# Patient Record
Sex: FEMALE | Race: ASIAN | URBAN - METROPOLITAN AREA
[De-identification: names, ages, dates, MRNs, and addresses within clinical notes are randomized per-mention and may not be internally consistent; named-entity substitution may affect disease eponyms.]

---

## 2020-07-24 ENCOUNTER — OFFICE VISIT (OUTPATIENT)
Dept: URBAN - METROPOLITAN AREA CLINIC 98 | Facility: CLINIC | Age: 70
End: 2020-07-24

## 2020-08-04 ENCOUNTER — LAB OUTSIDE AN ENCOUNTER (OUTPATIENT)
Dept: URBAN - METROPOLITAN AREA CLINIC 96 | Facility: CLINIC | Age: 70
End: 2020-08-04

## 2020-08-05 ENCOUNTER — OFFICE VISIT (OUTPATIENT)
Dept: URBAN - METROPOLITAN AREA CLINIC 96 | Facility: CLINIC | Age: 70
End: 2020-08-05
Payer: MEDICARE

## 2020-08-05 ENCOUNTER — OFFICE VISIT (OUTPATIENT)
Dept: URBAN - METROPOLITAN AREA CLINIC 96 | Facility: CLINIC | Age: 70
End: 2020-08-05

## 2020-08-05 ENCOUNTER — WEB ENCOUNTER (OUTPATIENT)
Dept: URBAN - METROPOLITAN AREA CLINIC 96 | Facility: CLINIC | Age: 70
End: 2020-08-05

## 2020-08-05 ENCOUNTER — LAB OUTSIDE AN ENCOUNTER (OUTPATIENT)
Dept: URBAN - METROPOLITAN AREA CLINIC 96 | Facility: CLINIC | Age: 70
End: 2020-08-05

## 2020-08-05 DIAGNOSIS — K59.00 CONSTIPATION, UNSPECIFIED CONSTIPATION TYPE: ICD-10-CM

## 2020-08-05 DIAGNOSIS — Z86.010 PERSONAL HISTORY OF COLONIC POLYPS: ICD-10-CM

## 2020-08-05 DIAGNOSIS — K21.9 GASTROESOPHAGEAL REFLUX DISEASE, ESOPHAGITIS PRESENCE NOT SPECIFIED: ICD-10-CM

## 2020-08-05 DIAGNOSIS — G20 PARKINSON DISEASE: ICD-10-CM

## 2020-08-05 DIAGNOSIS — K44.9 HIATAL HERNIA: ICD-10-CM

## 2020-08-05 PROCEDURE — 3017F COLORECTAL CA SCREEN DOC REV: CPT | Performed by: INTERNAL MEDICINE

## 2020-08-05 PROCEDURE — G8420 CALC BMI NORM PARAMETERS: HCPCS | Performed by: INTERNAL MEDICINE

## 2020-08-05 PROCEDURE — G9903 PT SCRN TBCO ID AS NON USER: HCPCS | Performed by: INTERNAL MEDICINE

## 2020-08-05 PROCEDURE — G8427 DOCREV CUR MEDS BY ELIG CLIN: HCPCS | Performed by: INTERNAL MEDICINE

## 2020-08-05 PROCEDURE — 99204 OFFICE O/P NEW MOD 45 MIN: CPT | Performed by: INTERNAL MEDICINE

## 2020-08-05 RX ORDER — ONDANSETRON HYDROCHLORIDE 4 MG/1
1 TABLET AS NEEDED TABLET, FILM COATED ORAL ONCE A DAY
Qty: 2 | Refills: 0 | OUTPATIENT
Start: 2020-08-05

## 2020-08-05 RX ORDER — POLYETHYLENE GLYOCOL 3350, SODIUM CHLORIDE, SODIUM BICARBONATE AND POTASSIUM CHLORIDE 420; 11.2; 5.72; 1.48 G/4L; G/4L; G/4L; G/4L
AS DIRECTED POWDER, FOR SOLUTION NASOGASTRIC; ORAL AS DIRECTED
Qty: 420 GRAM | Refills: 0 | OUTPATIENT
Start: 2020-08-05 | End: 2020-08-06

## 2020-08-05 NOTE — HPI-TODAY'S VISIT:
This is a 70-year-old female referred by Dr. Travis Watson for colon cancer screening as well as for some indigestion.  Previously patient was under the care of Dr. Betito Arellano.  She had both upper endoscopy and colonoscopy done by him on June 8, 2017.  The EGD revealed a tongue of Hill's mucosa and linear ulceration at the GE junction and a small amount of residue of food in the stomach, small hiatal hernia.  Colonoscopy revealed internal hemorrhoids, angulation and redundancy of the colon, some residual stool in various parts of the colon, a flat, 1 cm polyp removed from the distal transverse colon, and sigmoid diverticulosis.  Due to the prep, angulation of colon Dr. Arellano had recommended a follow-up colonoscopy in 2 to 3 years.  He had recommended a PPI for the esophageal ulcer and avoidance of NSAIDs.  Patient also apparently is a carrier of chronic hep B so he recommended yearly ultrasound and transaminase checks. Pt also has a hx of Parkinson's.

## 2020-08-05 NOTE — HPI-TODAY'S VISIT:
This is a 70-year-old female referred for colon cancer screening by Dr. Travis Watson.  Patient was last under the care of Dr. Arellano and she had both upper endoscopy and colonoscopy done on  June 8, 2017.  She had chronic esophagitis and chronic carditis seen as well as a tubular adenoma removed from the transverse colon.  She had normal random colon biopsies and TI biopsies were normal.  Dr. Arellano recommended a follow-up exam in 2 to 3 years.  She did have a hiatal hernia seen on the upper endoscopy and some retained food in the stomach.  She had also some sharp angulations of the colon suggesting possible adhesions as well as diverticulosis of the colon.  Dr. Arellano ordered a emptying scan of the stomach that was done in June 2017 and this was normal.  Pts  here with her. Pt has not been on reflux meds for years. Pts  says doesnt like to take meds so does not want to take nexium or antacids. Pt says she gets some bandlike indigestion occionally. Pt goes everyday but stools sometimes hard. Pt says appetite ok.

## 2020-08-18 ENCOUNTER — OFFICE VISIT (OUTPATIENT)
Dept: URBAN - METROPOLITAN AREA MEDICAL CENTER 28 | Facility: MEDICAL CENTER | Age: 70
End: 2020-08-18
Payer: MEDICARE

## 2020-08-18 DIAGNOSIS — D12.3 ADENOMA OF TRANSVERSE COLON: ICD-10-CM

## 2020-08-18 DIAGNOSIS — D12.2 ADENOMA OF ASCENDING COLON: ICD-10-CM

## 2020-08-18 DIAGNOSIS — D12.5 ADENOMA OF SIGMOID COLON: ICD-10-CM

## 2020-08-18 DIAGNOSIS — Z86.010 H/O ADENOMATOUS POLYP OF COLON: ICD-10-CM

## 2020-08-18 PROCEDURE — G9936 PMH PLYP/NEO CO/RECT/JUN/ANS: HCPCS | Performed by: INTERNAL MEDICINE

## 2020-08-18 PROCEDURE — 45380 COLONOSCOPY AND BIOPSY: CPT | Performed by: INTERNAL MEDICINE

## 2020-09-02 ENCOUNTER — WEB ENCOUNTER (OUTPATIENT)
Dept: URBAN - METROPOLITAN AREA CLINIC 96 | Facility: CLINIC | Age: 70
End: 2020-09-02

## 2020-10-19 ENCOUNTER — LAB OUTSIDE AN ENCOUNTER (OUTPATIENT)
Dept: URBAN - METROPOLITAN AREA CLINIC 96 | Facility: CLINIC | Age: 70
End: 2020-10-19

## 2020-10-20 ENCOUNTER — OFFICE VISIT (OUTPATIENT)
Dept: URBAN - METROPOLITAN AREA CLINIC 96 | Facility: CLINIC | Age: 70
End: 2020-10-20
Payer: MEDICARE

## 2020-10-20 ENCOUNTER — WEB ENCOUNTER (OUTPATIENT)
Dept: URBAN - METROPOLITAN AREA CLINIC 96 | Facility: CLINIC | Age: 70
End: 2020-10-20

## 2020-10-20 DIAGNOSIS — Z86.010 PERSONAL HISTORY OF COLONIC POLYPS: ICD-10-CM

## 2020-10-20 DIAGNOSIS — K21.9 ACID REFLUX: ICD-10-CM

## 2020-10-20 PROCEDURE — G8418 CALC BMI BLW LOW PARAM F/U: HCPCS | Performed by: INTERNAL MEDICINE

## 2020-10-20 PROCEDURE — 1036F TOBACCO NON-USER: CPT | Performed by: INTERNAL MEDICINE

## 2020-10-20 PROCEDURE — 99213 OFFICE O/P EST LOW 20 MIN: CPT | Performed by: INTERNAL MEDICINE

## 2020-10-20 PROCEDURE — G8427 DOCREV CUR MEDS BY ELIG CLIN: HCPCS | Performed by: INTERNAL MEDICINE

## 2020-10-20 PROCEDURE — G8482 FLU IMMUNIZE ORDER/ADMIN: HCPCS | Performed by: INTERNAL MEDICINE

## 2020-10-20 PROCEDURE — 3017F COLORECTAL CA SCREEN DOC REV: CPT | Performed by: INTERNAL MEDICINE

## 2020-10-20 PROCEDURE — G9903 PT SCRN TBCO ID AS NON USER: HCPCS | Performed by: INTERNAL MEDICINE

## 2020-10-20 PROCEDURE — 99204 OFFICE O/P NEW MOD 45 MIN: CPT | Performed by: INTERNAL MEDICINE

## 2020-10-20 RX ORDER — ONDANSETRON HYDROCHLORIDE 4 MG/1
1 TABLET AS NEEDED TABLET, FILM COATED ORAL ONCE A DAY
Qty: 2 | Refills: 0 | Status: ACTIVE | COMMUNITY
Start: 2020-08-05

## 2020-10-20 NOTE — HPI-TODAY'S VISIT:
This is a 70-year-old female referred by Dr. Travis Watson for f/u visit after colon cancer screening as well as for some indigestion.  Previously patient was under the care of Dr. Betito Arellano.  She had both upper endoscopy and colonoscopy done by him on June 8, 2017.  The EGD revealed a tongue of Hill's mucosa and linear ulceration at the GE junction and a small amount of residue of food in the stomach, small hiatal hernia.  Colonoscopy revealed internal hemorrhoids, angulation and redundancy of the colon, some residual stool in various parts of the colon, a flat, 1 cm polyp removed from the distal transverse colon, and sigmoid diverticulosis.  Due to the prep, angulation of colon Dr. Arellano had recommended a follow-up colonoscopy in 2 to 3 years.  He had recommended a PPI for the esophageal ulcer and avoidance of NSAIDs.  Patient also apparently is a carrier of chronic hep B so he recommended yearly ultrasound and transaminase checks. Pt also has a hx of Parkinson's. Patient had a colonoscopy at [facility] lab in August 18, 2020.  This exam revealed small internal hemorrhoids, a 4 mm ascending polyp that was removed, 5 mm transverse polyp that was removed and 2 3 to 4 mm sigmoid polyps that removed.  She had a very tortuous and redundant colon with sharp angulations especially in the sigmoid making visualization in that area bit limited in a pediatric colonoscope was used for the exam.  Pathology revealed sessile serrated adenoma as well as tubular adenomas.  I went over the results of the colonoscopy as well as polypectomies.  She does have Parkinson's disease but depending on her overall health I suggested we perhaps do a colonoscopy in 2 years rather than 3 years because of the fact that she had a sessile serrated polyp and a very sharp angulated sigmoid colon.  I discussed the option of doing this in 2 years over 3 years with both the patient and her . Pt somewhat depressed about her Parkinson's and not getting better. Has some constipation and gets full with foods at times but no n/v.

## 2022-07-26 ENCOUNTER — LAB OUTSIDE AN ENCOUNTER (OUTPATIENT)
Dept: URBAN - METROPOLITAN AREA CLINIC 96 | Facility: CLINIC | Age: 72
End: 2022-07-26

## 2022-07-27 ENCOUNTER — CLAIMS CREATED FROM THE CLAIM WINDOW (OUTPATIENT)
Dept: URBAN - METROPOLITAN AREA CLINIC 96 | Facility: CLINIC | Age: 72
End: 2022-07-27
Payer: MEDICARE

## 2022-07-27 ENCOUNTER — DASHBOARD ENCOUNTERS (OUTPATIENT)
Age: 72
End: 2022-07-27

## 2022-07-27 ENCOUNTER — LAB OUTSIDE AN ENCOUNTER (OUTPATIENT)
Dept: URBAN - METROPOLITAN AREA CLINIC 96 | Facility: CLINIC | Age: 72
End: 2022-07-27

## 2022-07-27 ENCOUNTER — WEB ENCOUNTER (OUTPATIENT)
Dept: URBAN - METROPOLITAN AREA CLINIC 96 | Facility: CLINIC | Age: 72
End: 2022-07-27

## 2022-07-27 VITALS
HEART RATE: 55 BPM | SYSTOLIC BLOOD PRESSURE: 96 MMHG | WEIGHT: 102 LBS | HEIGHT: 63 IN | BODY MASS INDEX: 18.07 KG/M2 | DIASTOLIC BLOOD PRESSURE: 62 MMHG | TEMPERATURE: 96.3 F

## 2022-07-27 DIAGNOSIS — K21.9 ACID REFLUX: ICD-10-CM

## 2022-07-27 DIAGNOSIS — K59.04 CHRONIC IDIOPATHIC CONSTIPATION: ICD-10-CM

## 2022-07-27 DIAGNOSIS — K22.70 BARRETT'S ESOPHAGUS WITHOUT DYSPLASIA: ICD-10-CM

## 2022-07-27 DIAGNOSIS — K21.0 GASTROESOPHAGEAL REFLUX DISEASE WITH ESOPHAGITIS: ICD-10-CM

## 2022-07-27 DIAGNOSIS — G20 PARKINSON DISEASE: ICD-10-CM

## 2022-07-27 DIAGNOSIS — Z86.010 PERSONAL HISTORY OF COLONIC POLYPS: ICD-10-CM

## 2022-07-27 PROBLEM — 82934008: Status: ACTIVE | Noted: 2022-07-27

## 2022-07-27 PROBLEM — 49049000 PARKINSON DISEASE: Status: ACTIVE | Noted: 2022-07-26

## 2022-07-27 PROBLEM — 266433003 GASTROESOPHAGEAL REFLUX DISEASE WITH ESOPHAGITIS: Status: ACTIVE | Noted: 2022-07-26

## 2022-07-27 PROCEDURE — 99204 OFFICE O/P NEW MOD 45 MIN: CPT | Performed by: INTERNAL MEDICINE

## 2022-07-27 RX ORDER — POLYETHYLENE GLYOCOL 3350, SODIUM CHLORIDE, SODIUM BICARBONATE AND POTASSIUM CHLORIDE 420; 11.2; 5.72; 1.48 G/4L; G/4L; G/4L; G/4L
AS DIRECTED POWDER, FOR SOLUTION NASOGASTRIC; ORAL AS DIRECTED
Qty: 420 GRAM | Refills: 0 | OUTPATIENT
Start: 2022-07-27 | End: 2022-07-28

## 2022-07-27 NOTE — HPI-TODAY'S VISIT:
This is a 70-year-old female referred by Dr. Travis Watson for f/u visit for possible EGD.  She saw Dr Arellano in the past and had both upper endoscopy and colonoscopy done by him on June 8, 2017.  The EGD revealed a tongue of Hill's mucosa and linear ulceration at the GE junction and a small amount of residue of food in the stomach, small hiatal hernia.  Colonoscopy revealed internal hemorrhoids, angulation and redundancy of the colon, some residual stool in various parts of the colon, a flat, 1 cm polyp removed from the distal transverse colon, and sigmoid diverticulosis.  Due to the prep, angulation of colon Dr. Arellano had recommended a follow-up colonoscopy in 2 to 3 years.  He had recommended a PPI for the esophageal ulcer and avoidance of NSAIDs.  Patient also apparently is a carrier of chronic hep B so he recommended yearly ultrasound and transaminase checks. Pt also has a hx of Parkinson's. Patient had a colonoscopy at [facility] lab in August 18, 2020.  This exam revealed small internal hemorrhoids, a 4 mm ascending polyp that was removed, 5 mm transverse polyp that was removed and 2 3 to 4 mm sigmoid polyps that removed.  She had a very tortuous and redundant colon with sharp angulations especially in the sigmoid making visualization in that area bit limited in a pediatric colonoscope was used for the exam.  Pathology revealed sessile serrated adenoma as well as tubular adenomas.  I went over the results of the colonoscopy as well as polypectomies.  She does have Parkinson's disease but depending on her overall health I suggested we perhaps do a colonoscopy in 2 years rather than 3 years because of the fact that she had a sessile serrated polyp and a very sharp angulated sigmoid colon.  I discussed the option of doing this in 2 years over 3 years with both the patient and her . Pt somewhat depressed about her Parkinson's and not getting better. Has some constipation and gets full with foods at times but no n/v. Pt was not sure she wanted to do f/u colon sooner at that time. Pts  here as well. Pt has felt she now wants to do the EGD to assess "whats going on" with her esophagus. Pt is still not on PPIs. Pt has at night mucus in back of her throat. Pt coughs when she lies down. Pt says Parkinson's not geting better. Pt does PT at [place]. Pt goes to bathroom EVERY 2-3 days and says stools are hard.

## 2022-07-28 PROBLEM — 428283002: Status: ACTIVE | Noted: 2020-08-04

## 2022-07-28 PROBLEM — 302914006: Status: ACTIVE | Noted: 2022-07-26

## 2022-09-28 ENCOUNTER — TELEPHONE ENCOUNTER (OUTPATIENT)
Dept: URBAN - METROPOLITAN AREA CLINIC 90 | Facility: CLINIC | Age: 72
End: 2022-09-28

## 2022-09-28 RX ORDER — POLYETHYLENE GLYOCOL 3350, SODIUM CHLORIDE, SODIUM BICARBONATE AND POTASSIUM CHLORIDE 420; 11.2; 5.72; 1.48 G/4L; G/4L; G/4L; G/4L
AS DIRECTED POWDER, FOR SOLUTION NASOGASTRIC; ORAL AS DIRECTED
Qty: 1 KIT | Refills: 0 | OUTPATIENT
Start: 2022-07-27 | End: 2022-09-29

## 2022-09-29 ENCOUNTER — TELEPHONE ENCOUNTER (OUTPATIENT)
Dept: URBAN - METROPOLITAN AREA CLINIC 90 | Facility: CLINIC | Age: 72
End: 2022-09-29

## 2022-11-08 ENCOUNTER — OFFICE VISIT (OUTPATIENT)
Dept: URBAN - METROPOLITAN AREA MEDICAL CENTER 28 | Facility: MEDICAL CENTER | Age: 72
End: 2022-11-08
Payer: MEDICARE

## 2022-11-08 DIAGNOSIS — K22.89 DILATATION OF ESOPHAGUS: ICD-10-CM

## 2022-11-08 DIAGNOSIS — Z86.010 ADENOMAS PERSONAL HISTORY OF COLONIC POLYPS: ICD-10-CM

## 2022-11-08 DIAGNOSIS — K63.5 BENIGN COLON POLYP: ICD-10-CM

## 2022-11-08 DIAGNOSIS — K21.9 ACID REFLUX: ICD-10-CM

## 2022-11-08 DIAGNOSIS — K31.89 ACQUIRED DEFORMITY OF DUODENUM: ICD-10-CM

## 2022-11-08 PROCEDURE — 43239 EGD BIOPSY SINGLE/MULTIPLE: CPT | Performed by: INTERNAL MEDICINE

## 2022-11-08 PROCEDURE — 45385 COLONOSCOPY W/LESION REMOVAL: CPT | Performed by: INTERNAL MEDICINE
